# Patient Record
(demographics unavailable — no encounter records)

---

## 2025-01-14 NOTE — PLAN
[FreeTextEntry1] : Hair loss - f/u labs - consider derm and GYN f/u - consider hair restoration specialist/PRP treatment   IBS - GI referral  Pelvic pain - GYN referral   HCM - f/u labs - healthy diet and exercise  - routine GYN - mammo 2023

## 2025-01-14 NOTE — HISTORY OF PRESENT ILLNESS
[FreeTextEntry1] : CPE [de-identified] : 38 year old female presents for CPE. She is well overall.  She does complain of hair loss. Feels she has been losing hair and is concerning to her She also complains of IBS. Has seen GI in the past but feels she may need colonoscopy. She complains of L sided pelvic pain. Plans to f/u with GYN as they were monitoring this. States her last internal exam is normal

## 2025-01-14 NOTE — HEALTH RISK ASSESSMENT
[Yes] : Yes [No] : In the past 12 months have you used drugs other than those required for medical reasons? No [0] : 2) Feeling down, depressed, or hopeless: Not at all (0) [PHQ-2 Negative - No further assessment needed] : PHQ-2 Negative - No further assessment needed [Never] : Never [Patient reported mammogram was normal] : Patient reported mammogram was normal [de-identified] : social  [de-identified] : intermittent exercise  [de-identified] : trying to eat healthy  [[id number]] : 0 [MammogramDate] : 1/23

## 2025-03-11 NOTE — HISTORY OF PRESENT ILLNESS
[Patient reported mammogram was abnormal] : Patient reported mammogram was abnormal [Patient reported PAP Smear was normal] : Patient reported PAP Smear was normal [Vasectomy (partner)] : has a partner with a vasectomy [Monogamous (Male Partner)] : is monogamous with a male partner [Y] : Positive pregnancy history [Mammogramdate] : 2023 [TextBox_19] : bilateral cyst- Benign- Z+P [PapSmeardate] : 2023 [TextBox_31] : Negative [LMPDate] : 03/06/25 [MensesFreq] : irregular [MensesLength] : 3 [PGHxTotal] : 2 [[facility] : [facility] [[facility]] : 2 [FreeTextEntry1] : C/S x 2 [TextBox_28] : Patient will get spotting for 2 to 3 days, stop bleeding and then get a full menses.  Had workup that was negative in 2024 [Currently Active] : currently active [Men] : men [Vaginal] : vaginal [FreeTextEntry3] : Vasctomy

## 2025-03-11 NOTE — PHYSICAL EXAM
[Appropriately responsive] : appropriately responsive [Alert] : alert [No Acute Distress] : no acute distress [No Lymphadenopathy] : no lymphadenopathy [Regular Rate Rhythm] : regular rate rhythm [No Murmurs] : no murmurs [Clear to Auscultation B/L] : clear to auscultation bilaterally [Soft] : soft [Non-tender] : non-tender [Non-distended] : non-distended [No HSM] : No HSM [No Lesions] : no lesions [No Mass] : no mass [Oriented x3] : oriented x3 [FreeTextEntry1] : Normal, no lesions [FreeTextEntry2] : Normal, no lesions [FreeTextEntry4] : Normal, no lesions seen or palpated.  Menstrual blood seen in vault. [FreeTextEntry5] : Smooth, pink, no lesions.  No cervical motion tenderness [FreeTextEntry6] : Retroverted, small, mobile, nontender.  No adnexal masses or tenderness bilaterally

## 2025-03-11 NOTE — PLAN
[FreeTextEntry1] : Pap drawn and sent. Will start annual mammograms and breast ultrasound next year secondary to her history of fibrocystic breasts.